# Patient Record
Sex: FEMALE
[De-identification: names, ages, dates, MRNs, and addresses within clinical notes are randomized per-mention and may not be internally consistent; named-entity substitution may affect disease eponyms.]

---

## 2023-05-02 ENCOUNTER — NURSE TRIAGE (OUTPATIENT)
Dept: OTHER | Facility: CLINIC | Age: 58
End: 2023-05-02

## 2023-05-03 NOTE — TELEPHONE ENCOUNTER
Location of patient: [facility]     [phone] is the facility number    Subjective: Caller states \"She is having a lot of anxiety and is very restless and wants to get out of bed. However, she can't get out of bed and move around and put weight on her L leg, which is not weight bearing. She keeps calling out to me to come and sit with her. She says, 'I'm so anxious and I also want something to sleep.' She is sating 97% on 2L. \"     Pain Severity:   Tramadol for the pain was given and she is not complaining of pain      What has been tried: staff has attempted to re-direct, however, staff believes that pt wants a staff member to stay in the room with her    Recommended disposition: Page on-call to inform of above    Care advice provided, patient verbalizes understanding; denies any other questions or concerns. Outcome: On-call Provider stated she will call the SN to order meds from [facility] and have them delivered to the facility. This triage is a result of a call to the [address]      Reason for Disposition   [1] Symptoms of anxiety or panic AND [2] has not been evaluated for this by physician    Protocols used:  Anxiety and Panic Attack-ADULT-

## 2023-05-07 ENCOUNTER — NURSE TRIAGE (OUTPATIENT)
Dept: OTHER | Facility: CLINIC | Age: 58
End: 2023-05-07